# Patient Record
Sex: FEMALE | Race: OTHER | Employment: OTHER | URBAN - METROPOLITAN AREA
[De-identification: names, ages, dates, MRNs, and addresses within clinical notes are randomized per-mention and may not be internally consistent; named-entity substitution may affect disease eponyms.]

---

## 2019-09-30 NOTE — PATIENT DISCUSSION
POAG, OU:  ELEVATED INTRAOCULAR PRESSURE. PRESCRIBE LATANOPROST QHS OU. RETURN FOR FOLLOW-UP AS SCHEDULED.

## 2019-10-01 NOTE — PATIENT DISCUSSION
YEARLY GLAUCOMA TESTING: DISCUSSED FOR PATIENT TO RETURN FOR ANNUAL FOLLOW-UP TESTS SUCH AS. .. HVF 24-2 AND OCT RNFL.

## 2019-11-06 NOTE — PATIENT DISCUSSION
POAG, OU:  INTRAOCULAR PRESSURE IS WITHIN ACCEPTABLE LIMITS. PATIENT INSTRUCTED TO CONTINUE ___LATANOPROST_____ AND RETURN FOR FOLLOW-UP AS SCHEDULED.

## 2020-06-09 NOTE — PATIENT DISCUSSION
POAG, OU:  INTRAOCULAR PRESSURE IS WITHIN ACCEPTABLE LIMITS. PATIENT INSTRUCTED TO CONTINUE __latanoprost qhs ou______ AND RETURN FOR FOLLOW-UP AS SCHEDULED.

## 2020-12-08 NOTE — PATIENT DISCUSSION
POAG, OU:  INTRAOCULAR PRESSURE IS WITHIN ACCEPTABLE LIMITS. PATIENT INSTRUCTED TO CONTINUE _LATANOPROST_______ AND RETURN FOR FOLLOW-UP AS SCHEDULED.

## 2021-05-07 NOTE — PATIENT DISCUSSION
CORNEAL ABRASION; OS, RESOLVED. DISCONTINUE OCUFLOX. OK TO CONTINUE EMYCIN MARICARMEN QHS. RTO AS SCHEDULED. CONTINUE TO MONITOR. RETURN FOR FOLLOW-UP AS SCHEDULED.

## 2021-06-16 NOTE — PATIENT DISCUSSION
POAG, OU:  INTRAOCULAR PRESSURE IS WITHIN ACCEPTABLE LIMITS. PATIENT INSTRUCTED TO CONTINUE _LATANOPROST QHS OU ___ AND RETURN FOR FOLLOW-UP AS SCHEDULED.

## 2022-01-06 ENCOUNTER — NEW PATIENT (OUTPATIENT)
Dept: URBAN - METROPOLITAN AREA CLINIC 53 | Facility: CLINIC | Age: 62
End: 2022-01-06

## 2022-01-06 DIAGNOSIS — H25.13: ICD-10-CM

## 2022-01-06 PROCEDURE — 92015 DETERMINE REFRACTIVE STATE: CPT

## 2022-01-06 PROCEDURE — 92004 COMPRE OPH EXAM NEW PT 1/>: CPT

## 2022-01-06 ASSESSMENT — VISUAL ACUITY
OU_CC: J1+
OU_CC: 20/20
OS_PH: 20/25
OD_GLARE: 20/30
OD_CC: 20/20
OS_CC: 20/30
OS_GLARE: 20/30
OS_CC: J1
OD_CC: J1+

## 2022-01-06 ASSESSMENT — TONOMETRY
OD_IOP_MMHG: 15
OS_IOP_MMHG: 15

## 2022-10-06 NOTE — PATIENT DISCUSSION
POAG, OU Counseling: I have reviewed the regimen of glaucoma drops with the patient and have stressed the importance of compliance. Patient instructed to continue present medication and return for follow-up as scheduled. Patient wants test for Chlamydia, declined scheduling appointment. Also wants meds refilled to [facility] please.    Thank you,    Sallie Jackson, MARY Mendes

## 2024-01-22 ENCOUNTER — NEW PATIENT (OUTPATIENT)
Dept: URBAN - METROPOLITAN AREA CLINIC 53 | Facility: CLINIC | Age: 64
End: 2024-01-22

## 2024-01-22 DIAGNOSIS — H43.813: ICD-10-CM

## 2024-01-22 DIAGNOSIS — H25.13: ICD-10-CM

## 2024-01-22 DIAGNOSIS — H02.886: ICD-10-CM

## 2024-01-22 DIAGNOSIS — H52.4: ICD-10-CM

## 2024-01-22 DIAGNOSIS — H02.883: ICD-10-CM

## 2024-01-22 PROCEDURE — 92015 DETERMINE REFRACTIVE STATE: CPT

## 2024-01-22 PROCEDURE — 99204 OFFICE O/P NEW MOD 45 MIN: CPT

## 2024-01-22 ASSESSMENT — VISUAL ACUITY
OU_SC: J1
OU_CC: J1+
OS_SC: 20/30-1
OS_CC: 20/20-1
OS_GLARE: 20/25
OD_SC: 20/20
OD_CC: 20/20
OD_GLARE: 20/25

## 2024-01-22 ASSESSMENT — TONOMETRY
OS_IOP_MMHG: 12
OD_IOP_MMHG: 10